# Patient Record
Sex: FEMALE | Race: WHITE | NOT HISPANIC OR LATINO | Employment: STUDENT | URBAN - METROPOLITAN AREA
[De-identification: names, ages, dates, MRNs, and addresses within clinical notes are randomized per-mention and may not be internally consistent; named-entity substitution may affect disease eponyms.]

---

## 2021-08-23 ENCOUNTER — TELEPHONE (OUTPATIENT)
Dept: INTERNAL MEDICINE | Facility: CLINIC | Age: 21
End: 2021-08-23

## 2022-04-08 DIAGNOSIS — E55.9 VITAMIN D DEFICIENCY: Primary | ICD-10-CM

## 2022-04-08 DIAGNOSIS — Z00.00 ROUTINE PHYSICAL EXAMINATION: ICD-10-CM

## 2022-04-08 DIAGNOSIS — Z51.81 THERAPEUTIC DRUG MONITORING: ICD-10-CM

## 2022-04-08 RX ORDER — NORETHINDRONE ACETATE AND ETHINYL ESTRADIOL, AND FERROUS FUMARATE 1MG-20(24)
1 KIT ORAL DAILY
COMMUNITY
Start: 2022-02-06

## 2022-04-19 ENCOUNTER — LAB (OUTPATIENT)
Dept: LAB | Facility: HOSPITAL | Age: 22
End: 2022-04-19

## 2022-04-19 DIAGNOSIS — E55.9 VITAMIN D DEFICIENCY: ICD-10-CM

## 2022-04-19 DIAGNOSIS — Z00.00 ROUTINE PHYSICAL EXAMINATION: ICD-10-CM

## 2022-04-19 DIAGNOSIS — Z51.81 THERAPEUTIC DRUG MONITORING: ICD-10-CM

## 2022-04-19 LAB
25(OH)D3 SERPL-MCNC: 31.1 NG/ML (ref 30–100)
ALBUMIN SERPL-MCNC: 4.5 G/DL (ref 3.5–5.2)
ALBUMIN/GLOB SERPL: 1.7 G/DL
ALP SERPL-CCNC: 41 U/L (ref 39–117)
ALT SERPL W P-5'-P-CCNC: 8 U/L (ref 1–33)
ANION GAP SERPL CALCULATED.3IONS-SCNC: 12 MMOL/L (ref 5–15)
AST SERPL-CCNC: 14 U/L (ref 1–32)
BACTERIA UR QL AUTO: ABNORMAL /HPF
BILIRUB SERPL-MCNC: 0.8 MG/DL (ref 0–1.2)
BILIRUB UR QL STRIP: NEGATIVE
BUN SERPL-MCNC: 6 MG/DL (ref 6–20)
BUN/CREAT SERPL: 8.3 (ref 7–25)
CALCIUM SPEC-SCNC: 9.4 MG/DL (ref 8.6–10.5)
CHLORIDE SERPL-SCNC: 104 MMOL/L (ref 98–107)
CLARITY UR: CLEAR
CO2 SERPL-SCNC: 23 MMOL/L (ref 22–29)
COLOR UR: YELLOW
CREAT SERPL-MCNC: 0.72 MG/DL (ref 0.57–1)
DEPRECATED RDW RBC AUTO: 40.2 FL (ref 37–54)
EGFRCR SERPLBLD CKD-EPI 2021: 122.9 ML/MIN/1.73
ERYTHROCYTE [DISTWIDTH] IN BLOOD BY AUTOMATED COUNT: 12.6 % (ref 12.3–15.4)
GLOBULIN UR ELPH-MCNC: 2.7 GM/DL
GLUCOSE SERPL-MCNC: 86 MG/DL (ref 65–99)
GLUCOSE UR STRIP-MCNC: NEGATIVE MG/DL
HCT VFR BLD AUTO: 40.9 % (ref 34–46.6)
HGB BLD-MCNC: 13.9 G/DL (ref 12–15.9)
HGB UR QL STRIP.AUTO: NEGATIVE
HYALINE CASTS UR QL AUTO: ABNORMAL /LPF
KETONES UR QL STRIP: NEGATIVE
LEUKOCYTE ESTERASE UR QL STRIP.AUTO: ABNORMAL
MCH RBC QN AUTO: 30.1 PG (ref 26.6–33)
MCHC RBC AUTO-ENTMCNC: 34 G/DL (ref 31.5–35.7)
MCV RBC AUTO: 88.5 FL (ref 79–97)
NITRITE UR QL STRIP: NEGATIVE
PH UR STRIP.AUTO: 5.5 [PH] (ref 5–8)
PLATELET # BLD AUTO: 312 10*3/MM3 (ref 140–450)
PMV BLD AUTO: 9.5 FL (ref 6–12)
POTASSIUM SERPL-SCNC: 3.8 MMOL/L (ref 3.5–5.2)
PROT SERPL-MCNC: 7.2 G/DL (ref 6–8.5)
PROT UR QL STRIP: NEGATIVE
RBC # BLD AUTO: 4.62 10*6/MM3 (ref 3.77–5.28)
RBC # UR STRIP: ABNORMAL /HPF
REF LAB TEST METHOD: ABNORMAL
SODIUM SERPL-SCNC: 139 MMOL/L (ref 136–145)
SP GR UR STRIP: 1.02 (ref 1–1.03)
SQUAMOUS #/AREA URNS HPF: ABNORMAL /HPF
T3FREE SERPL-MCNC: 3.48 PG/ML (ref 2–4.4)
T4 FREE SERPL-MCNC: 1.24 NG/DL (ref 0.93–1.7)
TSH SERPL DL<=0.05 MIU/L-ACNC: 1.57 UIU/ML (ref 0.27–4.2)
UROBILINOGEN UR QL STRIP: ABNORMAL
WBC # UR STRIP: ABNORMAL /HPF
WBC NRBC COR # BLD: 5.34 10*3/MM3 (ref 3.4–10.8)
YEAST URNS QL MICRO: ABNORMAL /HPF

## 2022-04-19 PROCEDURE — 84439 ASSAY OF FREE THYROXINE: CPT

## 2022-04-19 PROCEDURE — 84481 FREE ASSAY (FT-3): CPT

## 2022-04-19 PROCEDURE — 81001 URINALYSIS AUTO W/SCOPE: CPT

## 2022-04-19 PROCEDURE — 80050 GENERAL HEALTH PANEL: CPT

## 2022-04-19 PROCEDURE — 80061 LIPID PANEL: CPT

## 2022-04-19 PROCEDURE — 83704 LIPOPROTEIN BLD QUAN PART: CPT

## 2022-04-19 PROCEDURE — 82306 VITAMIN D 25 HYDROXY: CPT

## 2022-04-19 PROCEDURE — 36415 COLL VENOUS BLD VENIPUNCTURE: CPT

## 2022-04-21 LAB
CHOLEST SERPL-MCNC: 202 MG/DL (ref 100–199)
HDL SERPL-SCNC: 49.1 UMOL/L
HDLC SERPL-MCNC: 77 MG/DL
LDL SERPL QN: 21 NM
LDL SERPL-SCNC: 1344 NMOL/L
LDL SMALL SERPL-SCNC: 571 NMOL/L
LDLC SERPL CALC-MCNC: 106 MG/DL (ref 0–99)
TRIGL SERPL-MCNC: 111 MG/DL (ref 0–149)

## 2022-04-26 ENCOUNTER — OFFICE VISIT (OUTPATIENT)
Dept: INTERNAL MEDICINE | Facility: CLINIC | Age: 22
End: 2022-04-26

## 2022-04-26 VITALS
HEIGHT: 66 IN | RESPIRATION RATE: 18 BRPM | HEART RATE: 97 BPM | SYSTOLIC BLOOD PRESSURE: 123 MMHG | BODY MASS INDEX: 23.78 KG/M2 | OXYGEN SATURATION: 99 % | DIASTOLIC BLOOD PRESSURE: 79 MMHG | WEIGHT: 148 LBS

## 2022-04-26 DIAGNOSIS — Z11.59 NEED FOR HEPATITIS C SCREENING TEST: ICD-10-CM

## 2022-04-26 DIAGNOSIS — E55.9 VITAMIN D DEFICIENCY: Chronic | ICD-10-CM

## 2022-04-26 DIAGNOSIS — Z00.00 ROUTINE PHYSICAL EXAMINATION: Primary | ICD-10-CM

## 2022-04-26 DIAGNOSIS — B37.31 VAGINAL CANDIDIASIS: ICD-10-CM

## 2022-04-26 DIAGNOSIS — Z51.81 THERAPEUTIC DRUG MONITORING: ICD-10-CM

## 2022-04-26 DIAGNOSIS — E78.2 MIXED HYPERLIPIDEMIA: Chronic | ICD-10-CM

## 2022-04-26 PROCEDURE — 99385 PREV VISIT NEW AGE 18-39: CPT | Performed by: INTERNAL MEDICINE

## 2022-04-26 RX ORDER — FLUCONAZOLE 200 MG/1
TABLET ORAL
Qty: 2 TABLET | Refills: 0 | Status: SHIPPED | OUTPATIENT
Start: 2022-04-26

## 2022-04-26 RX ORDER — ACETAMINOPHEN 160 MG
2000 TABLET,DISINTEGRATING ORAL DAILY
Qty: 30 CAPSULE | Refills: 11
Start: 2022-04-26 | End: 2023-04-26

## 2022-04-26 NOTE — PROGRESS NOTES
04/26/2022    Patient Information  Michelle Bowie                                                                                          [address]  [facility] [address]      2000  [unfilled]  There is no work phone number on file.    Chief Complaint:     New patient physical examination.  No new acute complaints.    History of Present Illness:    21-year-old female who is previously healthy presents today to get established and to have routine annual physical exam.  She has no new acute complaints.  Her past medical history reviewed and updated and entered into the electronic medical record today.    Review of Systems   Constitutional: Negative.   HENT: Negative.    Eyes: Negative.    Cardiovascular: Negative.    Respiratory: Negative.    Endocrine: Negative.    Hematologic/Lymphatic: Negative.    Skin: Negative.    Musculoskeletal: Negative.    Gastrointestinal: Negative.    Genitourinary: Negative.    Neurological: Negative.    Psychiatric/Behavioral: Negative.    Allergic/Immunologic: Negative.        Active Problems:    Patient Active Problem List   Diagnosis   • Routine physical examination   • Therapeutic drug monitoring   • Vitamin D deficiency   • Hyperlipidemia         Past Medical History:   Diagnosis Date   • Hyperlipidemia 4/26/2022 April 26, 2022--routine physical. NMR reveals a total cholesterol of 202.  Triglycerides are normal at 111.  LDL particle number borderline at 1044.  Small LDL particle number slightly elevated at 571.  HDL particle number is very good at 49.1.    • Vitamin D deficiency 4/8/2022         Past Surgical History:   Procedure Laterality Date   • SKIN LESION EXCISION  2020 2020--precancerous mole removal.  Details not known.   • WISDOM TOOTH EXTRACTION Bilateral 01/01/2018 2018--wisdom teeth excision         Allergies   Allergen Reactions   • Amoxicillin Rash     Rash           Current Outpatient Medications:   •  Rosalina 24 Fe 1-20 MG-MCG(24)  "per tablet, Take 1 tablet by mouth Daily., Disp: , Rfl:   •  Cholecalciferol (Vitamin D3) 50 MCG (2000 UT) capsule, Take 1 capsule by mouth Daily., Disp: 30 capsule, Rfl: 11  •  fluconazole (DIFLUCAN) 200 MG tablet, Take 1 p.o. now and repeat in 1 week, Disp: 2 tablet, Rfl: 0      Family History   Problem Relation Age of Onset   • Breast cancer Mother    • Hypertension Mother    • Hyperlipidemia Mother    • Colon polyps Father    • Hyperlipidemia Father    • Nephrolithiasis Father    • Diabetes Father         Father with impaired fasting glucose/prediabetes   • Nephrolithiasis Brother    • Colon cancer Other         2 paternal great aunts with history of colon cancer         Social History     Socioeconomic History   • Marital status: Single   Tobacco Use   • Smoking status: Never Smoker   • Smokeless tobacco: Never Used   Vaping Use   • Vaping Use: Never used   Substance and Sexual Activity   • Alcohol use: Not Currently   • Drug use: Never   • Sexual activity: Defer         Vitals:    04/26/22 1439   BP: 123/79   BP Location: Right arm   Patient Position: Sitting   Cuff Size: Adult   Pulse: 97   Resp: 18   SpO2: 99%   Weight: 67.1 kg (148 lb)   Height: 167.6 cm (66\")        Body mass index is 23.89 kg/m².      Physical Exam:    General: Alert and oriented x 3.  No acute distress.  Normal affect.  HEENT: Pupils equal, round, reactive to light; extraocular movements intact; sclerae nonicteric; pharynx, ear canals and TMs normal.  Neck: Without JVD, thyromegaly, bruit, or adenopathy.  Lungs: Clear to auscultation in all fields.  Heart: Regular rate and rhythm without murmur, rub, gallop, or click.  Abdomen: Soft, nontender, without hepatosplenomegaly or hernia.  Bowel sounds normal.  : Deferred.  Rectal: Deferred.  Extremities: Without clubbing, cyanosis, edema, or pulse deficit.  Neurologic: Intact without focal deficit.  Normal station and gait observed during ingress and egress from the examination room.  Skin: " Without significant lesion.  Musculoskeletal: Unremarkable.    Lab/other results:    CMP is normal.  CBC normal.  NMR reveals a total cholesterol of 202.  Triglycerides are normal at 111.  LDL particle number borderline at 1044.  Small LDL particle number slightly elevated at 571.  HDL particle number is very good at 49.1.  Vitamin D normal at 31.1.  Urinalysis reveals small leukocytes.  Microscopic exam reveals 6-12 WBCs, 4+ bacteria but there are 3-6 squamous epithelial cells indicating skin contamination.  1+ yeast is present.  Thyroid function tests are normal.    Assessment/Plan:     Diagnosis Plan   1. Routine physical examination  Comprehensive Metabolic Panel    NMR LipoProfile    Vitamin D 25 Hydroxy    Urinalysis With Microscopic If Indicated (No Culture) - Urine, Clean Catch    TSH    T4, Free    T3, Free    Hepatitis C Antibody   2. Vitamin D deficiency  Vitamin D 25 Hydroxy    Cholecalciferol (Vitamin D3) 50 MCG (2000 UT) capsule   3. Hyperlipidemia  Comprehensive Metabolic Panel    NMR LipoProfile    TSH    T4, Free    T3, Free   4. Therapeutic drug monitoring  Urinalysis With Microscopic If Indicated (No Culture) - Urine, Clean Catch   5. Need for hepatitis C screening test  Hepatitis C Antibody   6. Vaginal candidiasis  fluconazole (DIFLUCAN) 200 MG tablet     Patient presents with essentially normal annual physical except for the following issues: She has a history of vitamin D deficiency which is normal.  I recommended that she start taking vitamin D 2000 units/day.  It appears she has very mild hyperlipidemia which is not bad enough to warrant medication.  Diet discussed.  Overall patient seems very healthy.  She does see the gynecologist on a yearly basis.    Plan is as follows: No treatment for mild elevation of lipids.  Recommend low carbohydrate diet and exercise.  Patient will follow-up in 1 year with lab prior for annual physical or follow-up as needed.    Addendum: Patient does have  candiduria and I think we should go ahead and treat that empirically.  She not really having any symptoms but certainly is at risk just because she is female.  Fluconazole 200 mg p.o. x1 now and repeat in 1 week.     Procedures

## 2023-04-21 ENCOUNTER — LAB (OUTPATIENT)
Dept: LAB | Facility: HOSPITAL | Age: 23
End: 2023-04-21
Payer: COMMERCIAL

## 2023-04-21 DIAGNOSIS — Z00.00 ROUTINE PHYSICAL EXAMINATION: ICD-10-CM

## 2023-04-21 DIAGNOSIS — E78.2 MIXED HYPERLIPIDEMIA: Chronic | ICD-10-CM

## 2023-04-21 DIAGNOSIS — E55.9 VITAMIN D DEFICIENCY: Chronic | ICD-10-CM

## 2023-04-21 DIAGNOSIS — Z11.59 NEED FOR HEPATITIS C SCREENING TEST: ICD-10-CM

## 2023-04-21 DIAGNOSIS — Z51.81 THERAPEUTIC DRUG MONITORING: ICD-10-CM

## 2023-04-21 LAB
25(OH)D3 SERPL-MCNC: 29.9 NG/ML (ref 30–100)
ALBUMIN SERPL-MCNC: 4.5 G/DL (ref 3.5–5.2)
ALBUMIN/GLOB SERPL: 1.9 G/DL
ALP SERPL-CCNC: 51 U/L (ref 39–117)
ALT SERPL W P-5'-P-CCNC: 13 U/L (ref 1–33)
ANION GAP SERPL CALCULATED.3IONS-SCNC: 12.2 MMOL/L (ref 5–15)
AST SERPL-CCNC: 29 U/L (ref 1–32)
BILIRUB SERPL-MCNC: 1.3 MG/DL (ref 0–1.2)
BILIRUB UR QL STRIP: NEGATIVE
BUN SERPL-MCNC: 8 MG/DL (ref 6–20)
BUN/CREAT SERPL: 11.9 (ref 7–25)
CALCIUM SPEC-SCNC: 9.6 MG/DL (ref 8.6–10.5)
CHLORIDE SERPL-SCNC: 105 MMOL/L (ref 98–107)
CLARITY UR: CLEAR
CO2 SERPL-SCNC: 22.8 MMOL/L (ref 22–29)
COLOR UR: YELLOW
CREAT SERPL-MCNC: 0.67 MG/DL (ref 0.57–1)
EGFRCR SERPLBLD CKD-EPI 2021: 127.7 ML/MIN/1.73
GLOBULIN UR ELPH-MCNC: 2.4 GM/DL
GLUCOSE SERPL-MCNC: 83 MG/DL (ref 65–99)
GLUCOSE UR STRIP-MCNC: NEGATIVE MG/DL
HCV AB SER DONR QL: NORMAL
HGB UR QL STRIP.AUTO: NEGATIVE
KETONES UR QL STRIP: NEGATIVE
LEUKOCYTE ESTERASE UR QL STRIP.AUTO: NEGATIVE
NITRITE UR QL STRIP: NEGATIVE
PH UR STRIP.AUTO: 6.5 [PH] (ref 5–8)
POTASSIUM SERPL-SCNC: 4 MMOL/L (ref 3.5–5.2)
PROT SERPL-MCNC: 6.9 G/DL (ref 6–8.5)
PROT UR QL STRIP: NEGATIVE
SODIUM SERPL-SCNC: 140 MMOL/L (ref 136–145)
SP GR UR STRIP: 1.01 (ref 1–1.03)
T3FREE SERPL-MCNC: 3.19 PG/ML (ref 2–4.4)
T4 FREE SERPL-MCNC: 1.14 NG/DL (ref 0.93–1.7)
TSH SERPL DL<=0.05 MIU/L-ACNC: 1.41 UIU/ML (ref 0.27–4.2)
UROBILINOGEN UR QL STRIP: NORMAL

## 2023-04-21 PROCEDURE — 86803 HEPATITIS C AB TEST: CPT

## 2023-04-21 PROCEDURE — 82306 VITAMIN D 25 HYDROXY: CPT

## 2023-04-21 PROCEDURE — 80061 LIPID PANEL: CPT

## 2023-04-21 PROCEDURE — 84439 ASSAY OF FREE THYROXINE: CPT

## 2023-04-21 PROCEDURE — 84481 FREE ASSAY (FT-3): CPT

## 2023-04-21 PROCEDURE — 84443 ASSAY THYROID STIM HORMONE: CPT

## 2023-04-21 PROCEDURE — 80053 COMPREHEN METABOLIC PANEL: CPT

## 2023-04-21 PROCEDURE — 81003 URINALYSIS AUTO W/O SCOPE: CPT

## 2023-04-21 PROCEDURE — 36415 COLL VENOUS BLD VENIPUNCTURE: CPT

## 2023-04-21 PROCEDURE — 83704 LIPOPROTEIN BLD QUAN PART: CPT

## 2023-04-24 LAB
CHOLEST SERPL-MCNC: 190 MG/DL (ref 100–199)
HDL SERPL-SCNC: 29.8 UMOL/L
HDLC SERPL-MCNC: 85 MG/DL
LDL SERPL QN: 21.7 NM
LDL SERPL-SCNC: 1024 NMOL/L
LDL SMALL SERPL-SCNC: <90 NMOL/L
LDLC SERPL CALC-MCNC: 92 MG/DL (ref 0–99)
TRIGL SERPL-MCNC: 69 MG/DL (ref 0–149)

## 2023-04-28 ENCOUNTER — OFFICE VISIT (OUTPATIENT)
Dept: INTERNAL MEDICINE | Facility: CLINIC | Age: 23
End: 2023-04-28
Payer: COMMERCIAL

## 2023-04-28 VITALS
DIASTOLIC BLOOD PRESSURE: 74 MMHG | BODY MASS INDEX: 24.08 KG/M2 | WEIGHT: 149.8 LBS | OXYGEN SATURATION: 99 % | HEART RATE: 99 BPM | SYSTOLIC BLOOD PRESSURE: 126 MMHG | HEIGHT: 66 IN

## 2023-04-28 DIAGNOSIS — Z86.16 HISTORY OF COVID-19: ICD-10-CM

## 2023-04-28 DIAGNOSIS — R53.82 CHRONIC FATIGUE: ICD-10-CM

## 2023-04-28 DIAGNOSIS — D64.9 CHRONIC ANEMIA: ICD-10-CM

## 2023-04-28 DIAGNOSIS — E78.2 MIXED HYPERLIPIDEMIA: Chronic | ICD-10-CM

## 2023-04-28 DIAGNOSIS — Z00.00 ROUTINE PHYSICAL EXAMINATION: Primary | ICD-10-CM

## 2023-04-28 DIAGNOSIS — E55.9 VITAMIN D DEFICIENCY: Chronic | ICD-10-CM

## 2023-04-28 RX ORDER — GLUCOSAMINE HCL 500 MG
TABLET ORAL
Qty: 30 TABLET
Start: 2023-04-28

## 2023-04-28 NOTE — PROGRESS NOTES
04/28/2023    Patient Information  Michelle Bowie                                                                                          [address] [facility] [address]      2000  [unfilled]  There is no work phone number on file.    Chief Complaint:     Physical examination/preventative visit.  Complaining of chronic fatigue.  History of Present Illness:    Young healthy 22-year old female presents today for routine annual physical examination/preventative visit.  Patient sees a gynecologist and we will not deal with the gynecologic issues at today's visit.  Patient reports for approximately 2 years she is just felt fatigued and tired no matter how much she sleeps.  She does not snore and has no hypersomnolence.  Sleep apnea would be low down on the list.  Patient has had a problem with anemia in the past and wonders if her iron is low.  See below.  Her past medical history reviewed and updated were necessary including health maintenance parameters.  This reveals she is currently up-to-date or else accounted for.    Review of Systems   Constitutional: Positive for malaise/fatigue.   HENT: Negative.    Eyes: Negative.    Cardiovascular: Negative.    Respiratory: Negative.    Endocrine: Negative.    Hematologic/Lymphatic: Negative.    Skin: Negative.    Musculoskeletal: Negative.    Gastrointestinal: Negative.    Genitourinary: Negative.    Neurological: Negative.    Psychiatric/Behavioral: Negative.    Allergic/Immunologic: Negative.        Active Problems:    Patient Active Problem List   Diagnosis   • Routine physical examination   • Therapeutic drug monitoring   • Vitamin D deficiency   • Hyperlipidemia   • Chronic fatigue   • History of COVID-19         Past Medical History:   Diagnosis Date   • Hyperlipidemia 4/26/2022 April 26, 2022--routine physical. NMR reveals a total cholesterol of 202.  Triglycerides are normal at 111.  LDL particle number borderline at 1044.  Small LDL  particle number slightly elevated at 571.  HDL particle number is very good at 49.1.    • Vitamin D deficiency 4/8/2022         Past Surgical History:   Procedure Laterality Date   • COSMETIC SURGERY  2020    pre-cancerous mole removal   • SKIN LESION EXCISION  2020 2020--precancerous mole removal.  Details not known.   • WISDOM TOOTH EXTRACTION Bilateral 01/01/2018 2018--wisdom teeth excision         Allergies   Allergen Reactions   • Amoxicillin Rash     Rash           Current Outpatient Medications:   •  Cholecalciferol (Vitamin D3) 75 MCG (3000 UT) tablet, Take vitamin D3 3000 units daily., Disp: 30 tablet, Rfl:       Family History   Problem Relation Age of Onset   • Breast cancer Mother    • Hypertension Mother    • Hyperlipidemia Mother    • Cancer Mother         breast cancer   • Kidney disease Mother         stones   • Miscarriages / Stillbirths Mother         2 miscarriages   • Colon polyps Father    • Hyperlipidemia Father    • Nephrolithiasis Father    • Diabetes Father         Father with impaired fasting glucose/prediabetes   • Kidney disease Father         kidney stones   • Nephrolithiasis Brother    • Colon cancer Other         2 paternal great aunts with history of colon cancer   • Heart disease Maternal Grandfather    • Heart disease Maternal Grandmother    • Thyroid disease Maternal Grandmother    • Heart disease Maternal Aunt    • Thyroid disease Maternal Aunt    • Kidney disease Brother         Kidney stones, chronic hydronephrosis   • Other Paternal Uncle         ALS         Social History     Socioeconomic History   • Marital status: Single   Tobacco Use   • Smoking status: Never   • Smokeless tobacco: Never   Vaping Use   • Vaping Use: Never used   Substance and Sexual Activity   • Alcohol use: Not Currently     Comment: Occasional   • Drug use: Never   • Sexual activity: Yes     Partners: Male     Birth control/protection: Condom         Vitals:    04/28/23 1026   BP: 126/74   BP  "Location: Left arm   Patient Position: Sitting   Cuff Size: Adult   Pulse: 99   SpO2: 99%   Weight: 67.9 kg (149 lb 12.8 oz)   Height: 167.6 cm (66\")        Body mass index is 24.18 kg/m².      Physical Exam:    General: Alert and oriented x 3.  No acute distress.  Normal affect.  HEENT: Pupils equal, round, reactive to light; extraocular movements intact; sclerae nonicteric; pharynx, ear canals and TMs normal.  Neck: Without JVD, thyromegaly, bruit, or adenopathy.  Lungs: Clear to auscultation in all fields.  Heart: Regular rate and rhythm without murmur, rub, gallop, or click.  Abdomen: Soft, nontender, without hepatosplenomegaly or hernia.  Bowel sounds normal.  : Deferred.  Rectal: Deferred.  Extremities: Without clubbing, cyanosis, edema, or pulse deficit.  Neurologic: Intact without focal deficit.  Normal station and gait observed during ingress and egress from the examination room.  Skin: Without significant lesion.  Musculoskeletal: Unremarkable.    Lab/other results:    CMP normal except bilirubin slightly elevated 1.3.  Total cholesterol 190.  Triglycerides 69.  LDL particle #1024.  HDL particle #29.8.  Vitamin D is a little low at 29.9.  Urinalysis normal.  Thyroid function test normal.  Hepatitis C antibody is nonreactive.    Assessment/Plan:     Diagnosis Plan   1. Routine physical examination  CBC (No Diff)    Iron Profile      2. Hyperlipidemia        3. Vitamin D deficiency  Cholecalciferol (Vitamin D3) 75 MCG (3000 UT) tablet      4. Chronic anemia  CBC (No Diff)    Iron Profile    Anemia, Megaloblastic, Serum    Vitamin B12    Folate      5. Chronic fatigue        6. History of COVID-19  SARS-CoV-2 Antibodies, Nucleocapsid (Natural Immunity)        Patient presents for routine annual physical/preventative visit and seems healthy except for complaints of chronic fatigue.  There is no definite associated symptoms to explain this.  She had iron deficiency at some point in the past and this needs to " be assessed as well.  I think it would be reasonable to check for vitamin B-12 deficiency also..  She no longer has hyperlipidemia but technically has dyslipidemia that is very mild and does not require medication.  Her vitamin D remains a little low and I encouraged her to take vitamin D 3000 units daily.  Also encouraged her to continue her follow-ups with GYN.    Several preventative health issues discussed including review of vaccinations and recommendations, including dietary issues, exercise and weight loss.  Safe sex practices discussed.  Patient advised to wear seatbelt whenever driving and avoid texting and driving.  Also advised to look both ways before crossing the street.  Patient is obviously too young for colon cancer screening.  Advised to avoid tobacco products and minimize alcohol consumption.    Plan is as follows: Vitamin D3 1000 units/day.  I have also recommended that patient try taking a super B complex once or twice daily.  Check CBC, iron studies, homocystine methylmalonic acid, vitamin B12 and folic acid levels.  I will contact patient when the results are available and possible further instructions.  No other changes or recommendations.  Continue with GYN care.  Patient can follow-up for an annual physical in a couple of years.      Procedures

## 2023-05-08 LAB
2ME-CITRATE SERPL-SCNC: 73 NMOL/L (ref 60–228)
CYSTATHIONIN SERPL-SCNC: 102 NMOL/L (ref 44–342)
ERYTHROCYTE [DISTWIDTH] IN BLOOD BY AUTOMATED COUNT: 12.5 % (ref 12.3–15.4)
FOLATE SERPL-MCNC: 16.6 NG/ML (ref 4.78–24.2)
HCT VFR BLD AUTO: 39 % (ref 34–46.6)
HCYS SERPL-SCNC: 7 UMOL/L (ref 5.1–13.9)
HGB BLD-MCNC: 13.5 G/DL (ref 12–15.9)
IRON SATN MFR SERPL: 20 % (ref 20–50)
IRON SERPL-MCNC: 79 MCG/DL (ref 37–145)
Lab: NORMAL
MCH RBC QN AUTO: 30 PG (ref 26.6–33)
MCHC RBC AUTO-ENTMCNC: 34.6 G/DL (ref 31.5–35.7)
MCV RBC AUTO: 86.7 FL (ref 79–97)
METHYLMALONATE SERPL-SCNC: 87 NMOL/L (ref 0–378)
PLATELET # BLD AUTO: 285 10*3/MM3 (ref 140–450)
RBC # BLD AUTO: 4.5 10*6/MM3 (ref 3.77–5.28)
SARS-COV-2 AB SERPL QL IA: POSITIVE
TIBC SERPL-MCNC: 390 MCG/DL
UIBC SERPL-MCNC: 311 MCG/DL (ref 112–346)
VIT B12 SERPL-MCNC: 661 PG/ML (ref 211–946)
WBC # BLD AUTO: 3.95 10*3/MM3 (ref 3.4–10.8)

## 2023-09-28 ENCOUNTER — HOSPITAL ENCOUNTER (OUTPATIENT)
Dept: GENERAL RADIOLOGY | Facility: HOSPITAL | Age: 23
Discharge: HOME | End: 2023-09-28
Admitting: INTERNAL MEDICINE
Payer: COMMERCIAL

## 2023-09-28 ENCOUNTER — OFFICE VISIT (OUTPATIENT)
Dept: INTERNAL MEDICINE | Facility: CLINIC | Age: 23
End: 2023-09-28
Payer: COMMERCIAL

## 2023-09-28 VITALS
OXYGEN SATURATION: 99 % | HEART RATE: 93 BPM | BODY MASS INDEX: 24.75 KG/M2 | HEIGHT: 66 IN | WEIGHT: 154 LBS | DIASTOLIC BLOOD PRESSURE: 66 MMHG | RESPIRATION RATE: 16 BRPM | TEMPERATURE: 97.7 F | SYSTOLIC BLOOD PRESSURE: 122 MMHG

## 2023-09-28 DIAGNOSIS — F43.0 PANIC ATTACK AS REACTION TO STRESS: ICD-10-CM

## 2023-09-28 DIAGNOSIS — R00.2 HEART PALPITATIONS: ICD-10-CM

## 2023-09-28 DIAGNOSIS — F41.0 PANIC ATTACK AS REACTION TO STRESS: ICD-10-CM

## 2023-09-28 DIAGNOSIS — R06.02 SHORTNESS OF BREATH: Primary | ICD-10-CM

## 2023-09-28 DIAGNOSIS — R20.2 PARESTHESIA OF BOTH HANDS: ICD-10-CM

## 2023-09-28 PROBLEM — Z86.16 HISTORY OF COVID-19: Chronic | Status: ACTIVE | Noted: 2023-04-28

## 2023-09-28 PROCEDURE — 71046 X-RAY EXAM CHEST 2 VIEWS: CPT

## 2023-09-28 PROCEDURE — 99214 OFFICE O/P EST MOD 30 MIN: CPT | Performed by: INTERNAL MEDICINE

## 2023-09-28 NOTE — PROGRESS NOTES
09/28/2023    Patient Information  Michelle Bowie                                                                                          [address]  [facility] [address]      2000  [unfilled]  There is no work phone number on file.    Chief Complaint:     Concern over difficulty breathing, heart palpitations, concerned she may have had a panic attack.    History of Present Illness:    Patient who is healthy but had COVID-19 infection back in February of this year but her symptoms were mild.  She presents with an episode that occurred about a month ago when she started feeling shortness of breath and felt like she could not get her breath and this was associated with her heart racing and she had some tingling in her fingers/hands.  She felt she might of had a panic attack.  This episode lasted about an hour and resolve spontaneously.  She has not had episodes since that time that have been that bad but she has had this vague sensation of not being able to catch her breath.  She has had no chest pain, no cough, no fever or chills or other systemic signs or symptoms.  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  She has had no previous history of panic attacks or anxiety.  Past medical history reviewed and updated were necessary.    Review of Systems   Constitutional: Negative. Negative for chills and fever.   HENT: Negative.     Eyes: Negative.    Cardiovascular:  Positive for palpitations. Negative for chest pain, claudication, cyanosis, dyspnea on exertion, irregular heartbeat, leg swelling, near-syncope, orthopnea, paroxysmal nocturnal dyspnea and syncope.   Respiratory:  Positive for shortness of breath. Negative for cough, hemoptysis, sputum production and wheezing.    Endocrine: Negative.    Hematologic/Lymphatic: Negative.    Skin: Negative.    Musculoskeletal: Negative.    Gastrointestinal:  Negative.    Genitourinary: Negative.    Neurological: Negative.    Psychiatric/Behavioral:  The patient is nervous/anxious.    Allergic/Immunologic: Negative.      Active Problems:    Patient Active Problem List   Diagnosis    Routine physical examination    Therapeutic drug monitoring    Vitamin D deficiency    Hyperlipidemia    Chronic fatigue    History of COVID-19    Shortness of breath    Heart palpitations    Paresthesia of both hands    Panic attack as reaction to stress         Past Medical History:   Diagnosis Date    History of COVID-19 04/28/2023 March 2021; February 2023 tested positive for COVID.  Symptoms mild.    Hyperlipidemia 04/26/2022 April 26, 2022--routine physical. NMR reveals a total cholesterol of 202.  Triglycerides are normal at 111.  LDL particle number borderline at 1044.  Small LDL particle number slightly elevated at 571.  HDL particle number is very good at 49.1.     Vitamin D deficiency 04/08/2022         Past Surgical History:   Procedure Laterality Date    COSMETIC SURGERY  2020    pre-cancerous mole removal    SKIN LESION EXCISION  2020 2020--precancerous mole removal.  Details not known.    WISDOM TOOTH EXTRACTION Bilateral 01/01/2018 2018--wisdom teeth excision         Allergies   Allergen Reactions    Amoxicillin Rash     Rash           Current Outpatient Medications:     Cholecalciferol (Vitamin D3) 75 MCG (3000 UT) tablet, Take vitamin D3 3000 units daily., Disp: 30 tablet, Rfl:       Family History   Problem Relation Age of Onset    Breast cancer Mother     Hypertension Mother     Hyperlipidemia Mother     Cancer Mother         breast cancer    Kidney disease Mother         stones    Miscarriages / Stillbirths Mother         2 miscarriages    Colon polyps Father     Hyperlipidemia Father     Nephrolithiasis Father     Diabetes Father         Father with impaired fasting glucose/prediabetes    Kidney disease Father         kidney stones    Nephrolithiasis Brother   "   Colon cancer Other         2 paternal great aunts with history of colon cancer    Heart disease Maternal Grandfather     Heart disease Maternal Grandmother     Thyroid disease Maternal Grandmother     Heart disease Maternal Aunt     Thyroid disease Maternal Aunt     Kidney disease Brother         Kidney stones, chronic hydronephrosis    Other Paternal Uncle         ALS         Social History     Socioeconomic History    Marital status: Single   Tobacco Use    Smoking status: Never    Smokeless tobacco: Never   Vaping Use    Vaping Use: Never used   Substance and Sexual Activity    Alcohol use: Not Currently     Comment: Occasional    Drug use: Never    Sexual activity: Yes     Partners: Male     Birth control/protection: Condom         Vitals:    09/28/23 1059   BP: 122/66   Pulse: 93   Resp: 16   Temp: 97.7 °F (36.5 °C)   TempSrc: Temporal   SpO2: 99%   Weight: 69.9 kg (154 lb)   Height: 167.6 cm (65.98\")        Body mass index is 24.87 kg/m².      Physical Exam:    General: Alert and oriented x 3.  No acute distress.  Normal affect.  HEENT: Pupils equal, round, reactive to light; extraocular movements intact; sclerae nonicteric; pharynx, ear canals and TMs normal.  Neck: Without JVD, thyromegaly, bruit, or adenopathy.  Lungs: Clear to auscultation in all fields.  Heart: Regular rate and rhythm without murmur, rub, gallop, or click.  Abdomen: Soft, nontender, without hepatosplenomegaly or hernia.  Bowel sounds normal.  : Deferred.  Rectal: Deferred.  Extremities: Without clubbing, cyanosis, edema, or pulse deficit.  Neurologic: Intact without focal deficit.  Normal station and gait observed during ingress and egress from the examination room.  Skin: Without significant lesion.  Musculoskeletal: Unremarkable.    Lab/other results:      Assessment/Plan:     Diagnosis Plan   1. Shortness of breath  XR Chest PA & Lateral      2. Paresthesia of both hands        3. Panic attack as reaction to stress        4. Heart " palpitations  XR Chest PA & Lateral        Young healthy female patient presents with an episode of shortness of breath associated with heart palpitations and paresthesias of both hands/fingers and this seems to be classic for a panic attack.  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  She has been having some interviews at various hospitals.  Currently does not feel all that bad and just wants to make sure that there is nothing wrong.  She was concerned about the possibility of having a heart problem and have tried to reassure her that that is not likely at all.  Her lungs are clear today and her heart exam was totally normal.  However, she did have COVID a few months ago and I think it be reasonable to get a chest x-ray to assure there is no pulmonary pathology.    Plan is as follows: Chest x-ray PA and lateral.  I will contact patient with results as soon as they are available in the next day.  She should contact me if she should have persistent or return of symptoms particularly if they were to get worse.  Patient will take her pulse if she were to have another episode of heart palpitations.  She indicates her pulse was about 130 when she had that initial episode.  It was regular as well.      Procedures      Answers submitted by the patient for this visit:  Primary Reason for Visit (Submitted on 9/21/2023)  What is the primary reason for your visit?: Shortness of Breath  Shortness of Breath Questionnaire (Submitted on 9/21/2023)  Chief Complaint: Shortness of breath  Chronicity: new  Onset: more than 1 month ago  Frequency: intermittently  Progression since onset: gradually improving  Episode duration: 45 Minutes  coryza: No  leg pain: No  rhinorrhea: No  swollen glands: No  Aggravating factors: emotional upset, eating

## 2023-10-02 ENCOUNTER — TELEPHONE (OUTPATIENT)
Dept: INTERNAL MEDICINE | Facility: CLINIC | Age: 23
End: 2023-10-02

## 2023-10-02 NOTE — TELEPHONE ENCOUNTER
Hub staff attempted to follow warm transfer process and was unsuccessful     Caller: Michelle Bowie    Relationship to patient: Self    Best call back number: [phone]     Patient is needing: RETURNING PHONE CALL FOR XRAY RESULTS

## 2024-03-18 ENCOUNTER — OFFICE VISIT (OUTPATIENT)
Dept: INTERNAL MEDICINE | Facility: CLINIC | Age: 24
End: 2024-03-18
Payer: COMMERCIAL

## 2024-03-18 VITALS
OXYGEN SATURATION: 99 % | BODY MASS INDEX: 25.23 KG/M2 | RESPIRATION RATE: 16 BRPM | DIASTOLIC BLOOD PRESSURE: 74 MMHG | HEIGHT: 66 IN | SYSTOLIC BLOOD PRESSURE: 120 MMHG | TEMPERATURE: 97.8 F | WEIGHT: 157 LBS | HEART RATE: 76 BPM

## 2024-03-18 DIAGNOSIS — R06.02 SHORTNESS OF BREATH: ICD-10-CM

## 2024-03-18 DIAGNOSIS — R00.2 HEART PALPITATIONS: Primary | ICD-10-CM

## 2024-03-18 DIAGNOSIS — R20.2 PARESTHESIA OF BOTH HANDS: ICD-10-CM

## 2024-03-18 DIAGNOSIS — F41.0 PANIC ATTACKS: Chronic | ICD-10-CM

## 2024-03-18 DIAGNOSIS — R07.89 SENSATION OF CHEST TIGHTNESS: ICD-10-CM

## 2024-03-18 PROBLEM — R53.82 CHRONIC FATIGUE: Status: RESOLVED | Noted: 2023-04-28 | Resolved: 2024-03-18

## 2024-03-18 PROCEDURE — 99214 OFFICE O/P EST MOD 30 MIN: CPT | Performed by: INTERNAL MEDICINE

## 2024-03-18 NOTE — PROGRESS NOTES
03/18/2024    Patient Information  Michelle Bowie                                                                                          [address]  [facility] [address]      2000  [unfilled]  There is no work phone number on file.    Chief Complaint:     Complaining of heart palpitations, sensation of tightness in chest and shortness of breath.    History of Present Illness:    Patient is essentially healthy saw me about half a year ago and was complaining of the same symptoms that she is having now and I felt she may be having a panic attack due to stress.  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  We did a chest x-ray which returned negative and since that time she has been fine up until just this past week when she had 3 episodes of her heart racing which is the first symptom that she notices.  She checked her pulse it was about 130s and seem to be regular.  Seen in follow-up with a sensation of chest tightness and shortness of breath but there was no radiation into the neck or jaw or down the arm.  She had no diaphoresis.  She was nauseated with it but no vomiting.  The episodes last approximately 30 minutes and resolved spontaneously.  She has no precipitating or aggravating factors.    Review of Systems   Constitutional: Negative for chills and fever.   HENT:  Negative for congestion and sore throat.    Cardiovascular:  Positive for chest pain and palpitations. Negative for dyspnea on exertion, leg swelling, orthopnea, paroxysmal nocturnal dyspnea and syncope.   Respiratory:  Positive for shortness of breath. Negative for hemoptysis, sputum production and wheezing.    Gastrointestinal:  Positive for vomiting.   Neurological:  Negative for headaches.   Psychiatric/Behavioral:  The patient is not nervous/anxious.        Active Problems:    Patient Active Problem List   Diagnosis    Routine physical  examination    Therapeutic drug monitoring    Vitamin D deficiency    Hyperlipidemia    History of COVID-19    Shortness of breath    Heart palpitations    Paresthesia of both hands    Panic attacks    Sensation of chest tightness         Past Medical History:   Diagnosis Date    History of COVID-19 04/28/2023 March 2021; February 2023 tested positive for COVID.  Symptoms mild.    Hyperlipidemia 04/26/2022 April 26, 2022--routine physical. NMR reveals a total cholesterol of 202.  Triglycerides are normal at 111.  LDL particle number borderline at 1044.  Small LDL particle number slightly elevated at 571.  HDL particle number is very good at 49.1.     Panic attacks 09/28/2023 September 28, 2023--Patient who is healthy but had COVID-19 infection back in February of this year but her symptoms were mild. She presents with an episode that occurred about a month ago when she started feeling shortness of breath and felt like she could not get her breath and this was associated with her heart racing and she had some tingling in her fingers/hands. She felt she might of had a p    Vitamin D deficiency 04/08/2022         Past Surgical History:   Procedure Laterality Date    COSMETIC SURGERY  2020    pre-cancerous mole removal    SKIN LESION EXCISION  2020 2020--precancerous mole removal.  Details not known.    WISDOM TOOTH EXTRACTION Bilateral 01/01/2018 2018--wisdom teeth excision         Allergies   Allergen Reactions    Amoxicillin Rash     Rash           Current Outpatient Medications:     Cholecalciferol (Vitamin D3) 75 MCG (3000 UT) tablet, Take vitamin D3 3000 units daily., Disp: 30 tablet, Rfl:       Family History   Problem Relation Age of Onset    Breast cancer Mother     Hypertension Mother     Hyperlipidemia Mother     Cancer Mother         breast cancer    Kidney disease Mother         stones    Miscarriages / Stillbirths Mother         2 miscarriages    Colon polyps Father     Hyperlipidemia Father  "    Nephrolithiasis Father     Diabetes Father         Father with impaired fasting glucose/prediabetes    Kidney disease Father         kidney stones    Nephrolithiasis Brother     Colon cancer Other         2 paternal great aunts with history of colon cancer    Heart disease Maternal Grandfather     Heart disease Maternal Grandmother     Thyroid disease Maternal Grandmother     Heart disease Maternal Aunt     Thyroid disease Maternal Aunt     Kidney disease Brother         Kidney stones, chronic hydronephrosis    Other Paternal Uncle         ALS         Social History     Socioeconomic History    Marital status: Single   Tobacco Use    Smoking status: Never    Smokeless tobacco: Never   Vaping Use    Vaping status: Never Used   Substance and Sexual Activity    Alcohol use: Not Currently     Comment: Occasional    Drug use: Never    Sexual activity: Yes     Partners: Male     Birth control/protection: Condom         Vitals:    03/18/24 0854   BP: 120/74   Pulse: 76   Resp: 16   Temp: 97.8 °F (36.6 °C)   TempSrc: Temporal   SpO2: 99%   Weight: 71.2 kg (157 lb)   Height: 167.6 cm (65.98\")        Body mass index is 25.35 kg/m².      Physical Exam:    General: Alert and oriented x 3.  No acute distress.  Normal affect.  HEENT: Pupils equal, round, reactive to light; extraocular movements intact; sclerae nonicteric; pharynx, ear canals and TMs normal.  Neck: Without JVD, thyromegaly, bruit, or adenopathy.  Lungs: Clear to auscultation in all fields.  Heart: Regular rate and rhythm without murmur, rub, gallop, or click.  Abdomen: Soft, nontender, without hepatosplenomegaly or hernia.  Bowel sounds normal.  : Deferred.  Rectal: Deferred.  Extremities: Without clubbing, cyanosis, edema, or pulse deficit.  Neurologic: Intact without focal deficit.  Normal station and gait observed during ingress and egress from the examination room.  Skin: Without significant lesion.  Musculoskeletal: Unremarkable.    Lab/other " results:      Assessment/Plan:     Diagnosis Plan   1. Heart palpitations  CBC (No Diff)    TSH    T4, Free    T3, Free    Comprehensive Metabolic Panel    Holter Monitor - 72 Hour Up To 15 Days    Adult Transthoracic Echo Complete W/ Cont if Necessary Per Protocol      2. Sensation of chest tightness  Holter Monitor - 72 Hour Up To 15 Days    Adult Transthoracic Echo Complete W/ Cont if Necessary Per Protocol      3. Shortness of breath  Holter Monitor - 72 Hour Up To 15 Days    Adult Transthoracic Echo Complete W/ Cont if Necessary Per Protocol      4. Paresthesia of both hands        5. Panic attacks            Patient presents with intermittent episodes of heart palpitations described as heart racing in the 130s and associated with chest tightness and shortness of breath.  She previously had some paresthesias in her hands but this is not a prominent feature now.  We felt like she was having panic attacks and certainly this diagnosis is still a possibility, I do think we need to rule out other etiologies such as arrhythmia, thyroid disease and electrolyte disturbances.  Mitral valve prolapse is certainly a possibility although I do not hear anything to suggest that on her exam.    Plan is as follows: Will check lab work today including CBC, CMP, thyroid function tests.  Zio patch and echocardiogram ordered.  I will have patient follow-up on the results of the studies are known.  Lab work will come back first and I will contact her with those results as soon as they are available.        Procedures          Answers submitted by the patient for this visit:  Primary Reason for Visit (Submitted on 3/17/2024)  What is the primary reason for your visit?: Shortness of Breath  Shortness of Breath Questionnaire (Submitted on 3/17/2024)  Chief Complaint: Shortness of breath  Chronicity: recurrent  Onset: in the past 7 days  Frequency: intermittently  Progression since onset: worsening  Fever: no fever  chest congestion:  No  dry cough: No  leg pain: Yes  swollen glands: No  Aggravating factors: emotional upset, eating  asthma: No  COPD: No

## 2024-03-19 LAB
ALBUMIN SERPL-MCNC: 4.7 G/DL (ref 3.5–5.2)
ALBUMIN/GLOB SERPL: 2.8 G/DL
ALP SERPL-CCNC: 51 U/L (ref 39–117)
ALT SERPL-CCNC: 9 U/L (ref 1–33)
AST SERPL-CCNC: 13 U/L (ref 1–32)
BILIRUB SERPL-MCNC: 0.4 MG/DL (ref 0–1.2)
BUN SERPL-MCNC: 10 MG/DL (ref 6–20)
BUN/CREAT SERPL: 13.9 (ref 7–25)
CALCIUM SERPL-MCNC: 9.4 MG/DL (ref 8.6–10.5)
CHLORIDE SERPL-SCNC: 107 MMOL/L (ref 98–107)
CO2 SERPL-SCNC: 24.1 MMOL/L (ref 22–29)
CREAT SERPL-MCNC: 0.72 MG/DL (ref 0.57–1)
EGFRCR SERPLBLD CKD-EPI 2021: 121.4 ML/MIN/1.73
ERYTHROCYTE [DISTWIDTH] IN BLOOD BY AUTOMATED COUNT: 12.8 % (ref 12.3–15.4)
GLOBULIN SER CALC-MCNC: 1.7 GM/DL
GLUCOSE SERPL-MCNC: 99 MG/DL (ref 65–99)
HCT VFR BLD AUTO: 39.7 % (ref 34–46.6)
HGB BLD-MCNC: 13.3 G/DL (ref 12–15.9)
MCH RBC QN AUTO: 29.9 PG (ref 26.6–33)
MCHC RBC AUTO-ENTMCNC: 33.5 G/DL (ref 31.5–35.7)
MCV RBC AUTO: 89.2 FL (ref 79–97)
PLATELET # BLD AUTO: 278 10*3/MM3 (ref 140–450)
POTASSIUM SERPL-SCNC: 4.4 MMOL/L (ref 3.5–5.2)
PROT SERPL-MCNC: 6.4 G/DL (ref 6–8.5)
RBC # BLD AUTO: 4.45 10*6/MM3 (ref 3.77–5.28)
SODIUM SERPL-SCNC: 141 MMOL/L (ref 136–145)
T3FREE SERPL-MCNC: 2.9 PG/ML (ref 2–4.4)
T4 FREE SERPL-MCNC: 1.14 NG/DL (ref 0.93–1.7)
TSH SERPL DL<=0.005 MIU/L-ACNC: 2.46 UIU/ML (ref 0.27–4.2)
WBC # BLD AUTO: 4.66 10*3/MM3 (ref 3.4–10.8)

## 2024-03-26 ENCOUNTER — TELEPHONE (OUTPATIENT)
Dept: INTERNAL MEDICINE | Facility: CLINIC | Age: 24
End: 2024-03-26

## 2024-03-26 NOTE — TELEPHONE ENCOUNTER
PER SCHEDULING PLEASE SEE BELOW  Comments    3/26 order is for 72hr to 15 days. [facility] needs to advise how many days pt is to wear. Returning to [facility] for an update      Please advise, [place]

## 2024-03-29 ENCOUNTER — HOSPITAL ENCOUNTER (OUTPATIENT)
Dept: CARDIOLOGY | Facility: HOSPITAL | Age: 24
Discharge: HOME | End: 2024-03-29
Payer: COMMERCIAL

## 2024-03-29 VITALS
BODY MASS INDEX: 25.16 KG/M2 | HEIGHT: 66 IN | SYSTOLIC BLOOD PRESSURE: 134 MMHG | HEART RATE: 95 BPM | WEIGHT: 156.53 LBS | DIASTOLIC BLOOD PRESSURE: 67 MMHG

## 2024-03-29 LAB
AORTIC DIMENSIONLESS INDEX: 0.7 (DI)
BH CV ECHO MEAS - AO MAX PG: 10 MMHG
BH CV ECHO MEAS - AO MEAN PG: 5 MMHG
BH CV ECHO MEAS - AO V2 MAX: 158 CM/SEC
BH CV ECHO MEAS - AO V2 VTI: 28.4 CM
BH CV ECHO MEAS - AVA(I,D): 2.36 CM2
BH CV ECHO MEAS - EDV(CUBED): 97.3 ML
BH CV ECHO MEAS - EDV(MOD-SP2): 54 ML
BH CV ECHO MEAS - EDV(MOD-SP4): 69 ML
BH CV ECHO MEAS - EF(MOD-BP): 63.5 %
BH CV ECHO MEAS - EF(MOD-SP2): 53.7 %
BH CV ECHO MEAS - EF(MOD-SP4): 72.5 %
BH CV ECHO MEAS - ESV(CUBED): 17.2 ML
BH CV ECHO MEAS - ESV(MOD-SP2): 25 ML
BH CV ECHO MEAS - ESV(MOD-SP4): 19 ML
BH CV ECHO MEAS - FS: 43.9 %
BH CV ECHO MEAS - IVS/LVPW: 1 CM
BH CV ECHO MEAS - IVSD: 0.7 CM
BH CV ECHO MEAS - LAT PEAK E' VEL: 24.2 CM/SEC
BH CV ECHO MEAS - LV DIASTOLIC VOL/BSA (35-75): 38.4 CM2
BH CV ECHO MEAS - LV MASS(C)D: 99.3 GRAMS
BH CV ECHO MEAS - LV MAX PG: 5.1 MMHG
BH CV ECHO MEAS - LV MEAN PG: 3 MMHG
BH CV ECHO MEAS - LV SYSTOLIC VOL/BSA (12-30): 10.6 CM2
BH CV ECHO MEAS - LV V1 MAX: 113 CM/SEC
BH CV ECHO MEAS - LV V1 VTI: 19.5 CM
BH CV ECHO MEAS - LVIDD: 4.6 CM
BH CV ECHO MEAS - LVIDS: 2.6 CM
BH CV ECHO MEAS - LVOT AREA: 3.4 CM2
BH CV ECHO MEAS - LVOT DIAM: 2.09 CM
BH CV ECHO MEAS - LVPWD: 0.7 CM
BH CV ECHO MEAS - MED PEAK E' VEL: 17.3 CM/SEC
BH CV ECHO MEAS - MV A MAX VEL: 55 CM/SEC
BH CV ECHO MEAS - MV DEC SLOPE: 1870 CM/SEC2
BH CV ECHO MEAS - MV DEC TIME: 0.17 SEC
BH CV ECHO MEAS - MV E MAX VEL: 101 CM/SEC
BH CV ECHO MEAS - MV E/A: 1.84
BH CV ECHO MEAS - MV MAX PG: 5.6 MMHG
BH CV ECHO MEAS - MV MEAN PG: 3 MMHG
BH CV ECHO MEAS - MV P1/2T: 19.6 MSEC
BH CV ECHO MEAS - MV V2 VTI: 23.6 CM
BH CV ECHO MEAS - MVA(P1/2T): 11.2 CM2
BH CV ECHO MEAS - MVA(VTI): 2.8 CM2
BH CV ECHO MEAS - PA ACC TIME: 0.12 SEC
BH CV ECHO MEAS - PA V2 MAX: 133 CM/SEC
BH CV ECHO MEAS - PULM DIAS VEL: 58.8 CM/SEC
BH CV ECHO MEAS - PULM S/D: 0.79
BH CV ECHO MEAS - PULM SYS VEL: 46.2 CM/SEC
BH CV ECHO MEAS - QP/QS: 1.3
BH CV ECHO MEAS - RV MAX PG: 3.9 MMHG
BH CV ECHO MEAS - RV V1 MAX: 99 CM/SEC
BH CV ECHO MEAS - RV V1 VTI: 20 CM
BH CV ECHO MEAS - RVOT DIAM: 2.36 CM
BH CV ECHO MEAS - SI(MOD-SP2): 16.1 ML/M2
BH CV ECHO MEAS - SI(MOD-SP4): 27.8 ML/M2
BH CV ECHO MEAS - SV(LVOT): 67 ML
BH CV ECHO MEAS - SV(MOD-SP2): 29 ML
BH CV ECHO MEAS - SV(MOD-SP4): 50 ML
BH CV ECHO MEAS - SV(RVOT): 87.5 ML
BH CV ECHO MEASUREMENTS AVERAGE E/E' RATIO: 4.87
BH CV XLRA - TDI S': 13.8 CM/SEC
LEFT ATRIUM VOLUME INDEX: 19.6 ML/M2
SINUS: 2.22 CM

## 2024-03-29 PROCEDURE — 93306 TTE W/DOPPLER COMPLETE: CPT

## 2024-04-04 ENCOUNTER — HOSPITAL ENCOUNTER (OUTPATIENT)
Dept: CARDIOLOGY | Facility: HOSPITAL | Age: 24
Discharge: HOME | End: 2024-04-04
Admitting: INTERNAL MEDICINE
Payer: COMMERCIAL

## 2024-04-04 DIAGNOSIS — R06.02 SHORTNESS OF BREATH: ICD-10-CM

## 2024-04-04 DIAGNOSIS — R00.2 HEART PALPITATIONS: ICD-10-CM

## 2024-04-04 DIAGNOSIS — R07.89 SENSATION OF CHEST TIGHTNESS: ICD-10-CM

## 2024-04-04 PROCEDURE — 93246 EXT ECG>7D<15D RECORDING: CPT
